# Patient Record
(demographics unavailable — no encounter records)

---

## 2024-11-22 NOTE — ADDENDUM
[FreeTextEntry1] : Documented by Beatriz Nathan acting as a scribe for Dr. Laron Barber on 11/22/2024. All medical record entries made by the Scribe were at my, Dr. Laron Barber's, direction and personally dictated by me on 11/22/2024. I have reviewed the chart and agree that the record accurately reflects my personal performance of the history, physical exam, assessment and plan. I have also personally directed, reviewed, and agree with the discharge instructions.

## 2024-11-22 NOTE — PHYSICAL EXAM
[No Acute Distress] : no acute distress [Normal Oropharynx] : normal oropharynx [II] : Mallampati Class: II [Normal Appearance] : normal appearance [No Neck Mass] : no neck mass [Normal Rate/Rhythm] : normal rate/rhythm [Normal S1, S2] : normal s1, s2 [No Murmurs] : no murmurs [No Resp Distress] : no resp distress [No Abnormalities] : no abnormalities [Benign] : benign [Normal Gait] : normal gait [No Clubbing] : no clubbing [No Cyanosis] : no cyanosis [No Edema] : no edema [FROM] : FROM [Normal Color/ Pigmentation] : normal color/ pigmentation [No Focal Deficits] : no focal deficits [Oriented x3] : oriented x3 [Normal Affect] : normal affect [TextBox_2] : overweight [TextBox_68] : I:E ratio 1:3; very mild forced expiratory wheeze

## 2024-11-22 NOTE — ASSESSMENT
[FreeTextEntry1] : Ms. LEVENTHALMALKIN is a 58 year old female coming into the office today for an initial evaluation with a prior history of chickenpox as a kid, overweight, osteoarthritis of right knee, severe persistent asthma, ?tracheal stenosis, Hashimoto's thyroiditis, alcoholism- SOB, chronic cough, severe persistent asthma, allergy, LPR/GERD, sleep apnea, TBM- s/p tracheobronchoplasty for TBM- residual cough (improved) - mild respiratory Sx (environmental) - residual respiratory Sx s/p URI (on Tezspire)- ?laryngospasm, ?OSAS  The patient's SOB is felt to be multifactorial: - Overweight - Out-of Shape - Poor breathing mechanics - (+) Tracheomalacia - Chronic cough  -severe persistent Asthma -?laryngospasm  -Allergies  -GERD/LPR - Cardiac  Problem 1:  severe Persistent Asthma -continue Breztri 2 puffs BID (add a spacer) -continue Xopenex 0.63 via nebulizer q6H prn  -continue Singulair 10 mg QHS -add Airsupra 2 inhalations Q12H PRN  -add  mg BID (11/2024) - Inhaler technique reviewed as well as oral hygiene techniques reviewed with patient. Avoidance of cold air, extremes of temperature, rescue inhaler should be used before exercise. Order of medication reviewed with patient. Recommended use of a cool mist humidifier in the bedroom. - Asthma is believed to be caused by inherited (genetic) and environmental factor, but its exact cause is unknown. Asthma may be triggered by allergens, lung infections, or irritants in the air. Asthma triggers are different for each person  problem 1A: biologic evaluation -on Tezspire as of 1/2024 -she is experiencing fewer exacerbations and better Sx control on Tezspire therapy  -type 2 asthma which accounts for approximately 70% of all cases is indicated by high eosinophil counts and elevated levels of T2 cytokines. Six biologic agents are FDA approved to treat moderate to severe asthma by targeting various cytokines and cell surface receptors involved in the inflammatory process in asthma. Several biologic therapies are also indicated for other inflammatory conditions marked by high eosinophils. Efficacy of biologic therapy should be assessed after 4-6 months of treatment.  Problem 2: Allergy - continue Clarinex 5 mg QHS - s/p given for blood work: - asthma profile, - food IgE panel, - eosinophil level, - IgE level, - Vitamin D level - Environmental measures for allergies were encouraged including mattress and pillow cover, air purifier, and environmental controls.  Problem 3: s/p Tracheomalacia - tracheobronchoplasty - s/p Dynamic CT -continue gabapentin 100mg q8h but to 300mg QHS if needed  - Tracheomalacia is usually acquired in adults and common causes include damage by tracheostomy or endotracheal intubation damaging the tracheal cartilage with increase risk with multiple intubations, prolonged intubation, and concurrent high dose steroid therapy; external chest wall trauma and surgery; chronic compression of the trachea by benign etiologies (eg, benign mediastinal goiter) or malignancy; relapsing polychondritis; or recurrent infection. Tracheomalacia can be asymptomatic, however signs or symptoms can develop as the severity of the airway narrowing progresses with major symptoms include dyspnea, cough, and sputum retention. Other symptoms include severe paroxysms of coughing, wheezing or stridor, barking cough and may be exacerbated by forced expiration, cough, and valsalva maneuver. Tracheomalacia is diagnosed by a bronchoscopic visualization of dynamic airway collapse on dynamic chest CT. Therapy is warranted in symptomatic patients with severe tracheomalacia and includes surgical repair as tracheobronchoplasty. The patient was referred to Dr. Lobo Mccarty or Dr. Toni Wills, at [facility] for a surgical consult.  Problem 3A: ?Laryngospasm -complete ENT evaluation with Dr. Marroquin et al. -recommended acupuncture -consider speech therapy if confirmed  Problem 4: LPR/GERD -continue Pepcid 40 mg QHS -add Pantoprazole 40 mg QAM, pre-breakfast  - Things to avoid including overeating, spicy foods, tight clothing, eating within three hours of bed, this list is not all inclusive. - For treatment of reflux, possible options discussed including diet control, H2 blockers, PPIs, as well as coating motility agents discussed as treatment options. Timing of meals and proximity of last meal to sleep were discussed. If symptoms persist, a formal gastrointestinal evaluation is needed.  Problem 5: Chronic cough (Asthma, TBM, Allergies, GERD/LPR- likely all present) - Addressed above -continue gabapentin 100mg QAM, 300mg QHS - Any cough greater than three weeks duration-differential diagnosis includes-asthma, upper airway cough syndrome, post nasal drip syndrome, gastroesophageal reflux, laryngopharyngeal reflux, cardiac disease (congestive heart failure, medicines, effects, etc), medication effects (b-blockers, ace inhibitors, ARBs, glaucoma meds, etc.), smoking, infectious, multifactorial, etc.  Problem 6: Likely GABE (elevated Mallampati class, snoring) ([place]) - Get home sleep study - Sleep apnea is associated with adverse clinical consequences which an affect most organ systems. Cardiovascular disease risk includes arrhythmias, atrial fibrillation, hypertension, coronary artery disease, and stroke. Metabolic disorders include diabetes type 2, non-alcoholic fatty liver disease. Mood disorder especially depression; and cognitive decline especially in the elderly. Associations with chronic reflux/Doan's esophagus some but not all inclusive. -Reasons include arousal consistent with hypopnea; respiratory events most prominent in REM sleep or supine position; therefore sleep staging and body position are important for accurate diagnosis and estimation of AHI.  Problem 7: Cardiac -Recommended cardiac follow up evaluation with cardiologist if needed  Problem 8: Overweight/out of shape -recommended Berberine OTC - Weight loss, exercise, and diet control were discussed and are highly encouraged. Treatment options were given such as, aqua therapy, and contacting a nutritionist. Recommended to use the elliptical, stationary bike, less use of treadmill. Mindful eating was explained to the patient Obesity is associated with worsening asthma, shortness of breath, and potential for cardiac disease, diabetes, and other underlying medical conditions  Problem 9: Poor Breathing Mechanics - Recommended Lucy Macdonaldf and [place] breathing techniques - Proper breathing techniques were reviewed with an emphasis of exhalation. Patient instructed to breath in for 1 second and out for four seconds. Patient was encouraged to not talk while walking.  Problem 10: Health maintenance -TDaP vaccine given in office 08/27/2024  -recommended RSV vaccine 2026 -s/p flu shot 2024 -recommended strep pneumonia vaccines: Prevnar-20 vaccine, followed by Pneumo vaccine 23 one year following -recommended early intervention for URIs -recommended regular osteoporosis evaluations-recommended early dermatological evaluations -recommended after the age of 50 to the age of 70, colonoscopy every 5 years  F/P in 4-6 months pt is encouraged to call or fax the office with any questions or concerns. Explained to the pt in full detail with demonstrations how to use the inhalers and inhaler hygiene. -Education provided to the PT regarding their visit and conditions.

## 2024-11-22 NOTE — PROCEDURE
[FreeTextEntry1] : PFTs revealed normal flows; FEV1 was 2.47L, which is 115% of predicted; normal flow volume loop. PFTs were performed to evaluate for asthma  FENO was 25; a normal value being less than 25 Fractional exhaled nitric oxide (FENO) is regarded as a simple, noninvasive method for assessing eosinophilic airway inflammation. Produced by a variety of cells within the lung, nitric oxide (NO) concentrations are generally low in healthy individuals. However, high concentrations of NO appear to be involved in nonspecific host defense mechanisms and chronic inflammatory diseases such as asthma. The American Thoracic Society (ATS) therefore has recommended using FENO to aid in the diagnosis and monitoring of eosinophilic airway inflammation and asthma, and for identifying steroid responsive individuals whose chronic respiratory symptoms may be caused by airway inflammation.

## 2024-11-22 NOTE — HISTORY OF PRESENT ILLNESS
[TextBox_4] : Ms. LEVENTHALMALKIN is a 58 year old female with a history of SOB, chronic cough, severe persistent asthma, allergy, LPR/GERD, sleep apnea, TBM presenting to the office today for a follow-up pulmonary evaluation. Her chief complaint is  -she notes she's a 6.5/10 at this time -she notes she has 2 types of coughs. One type is where she'll be walking or minimally exerting, and she coughs/chokes. The cough comes from her trachea. When she has this cough, she can't walk long distances. She doesn't bring up mucus with that cough. She's not experiencing that cough at this time. This cough is sometimes triggered with eating -she notes she brings up mucus when she has her asthmatic cough -she notes vision is stable  -she notes she sometimes wakes up with a choking feeling. She coughs 15 minutes before she can fall back asleep -she notes having a salty taste in her mouth -she denies SOB when lying supine or in the middle of the night  -she notes using Breztri with the spacer -she notes she's on Singulair, Gabapentin 100 mg in the morning, 300 mg at night, Lexapro, Pepcid 40 mg QHS, Benadryl at night -she notes snoring -she doesn't know how her cough is affected if she stops taking gabapentin because she uses it regularly -she notes she's using her rescue inhaler -she notes she's on Tezspire  -she denies any headaches, nausea, emesis, fever, chills, sweats, chest pain, chest pressure, wheezing, palpitations, diarrhea, constipation, dysphagia, vertigo, arthralgias, myalgias, leg swelling, itchy eyes, itchy ears, heartburn, reflux, or sour taste in the mouth.

## 2024-11-22 NOTE — REASON FOR VISIT
[Follow-Up] : a follow-up visit [Spouse] : spouse [TextBox_44] : SOB, chronic cough, severe persistent asthma, allergy, LPR/GERD, sleep apnea, TBM

## 2024-12-18 NOTE — PHYSICAL EXAM
[Midline] : trachea located in midline position [] : septum deviated to the right [Normal] : external appearance is normal [de-identified] : AU impacted cerumen

## 2024-12-18 NOTE — HISTORY OF PRESENT ILLNESS
[de-identified] : 58 y/o F referred by Dr. Barber for evaluation of laryngospasms  During last visit in November, recommended acupuncture and consider speech therapy, possible botox injections She has had a chronic cough for over 20 years.  She has a history of tracheomalacia -- s/p FB, Rt VATS, R.A., tracheobronchoplasty and MLND on 9/7/22 with Dr. Lobo Mccarty. She reports within the past year progressively having more discomfort with breathing -- unable to take a deep breath in. When bending over reports constant discomfort with breathing.  Dyspnea on exertion.  Constant "choking" sensation even when not eating.  When swallowing food occasionally feels like coughing. No unintentional weight loss. No swallow studies done.  She currently takes famotidine every night for reflux  Voice at baseline is hoarse -- slightly getting more raspy. Able to project. Denies total loss of voice or vocal fatigue.  Recent sinus infection given antibiotics course.

## 2024-12-18 NOTE — CONSULT LETTER
[FreeTextEntry2] : Dr. Robles Guthrie  [address] [place], [place] [id number] [phone] [FreeTextEntry3] : Wilfredo Marroquin MD, PhD Chief, Division of Laryngology Department of Otolaryngology [facility]olaryngology, [facility]  of Otolaryngology [facility]

## 2025-01-08 NOTE — PHYSICAL EXAM
[No Acute Distress] : no acute distress [Normal Oropharynx] : normal oropharynx [II] : Mallampati Class: II [Normal Appearance] : normal appearance [No Neck Mass] : no neck mass [Normal Rate/Rhythm] : normal rate/rhythm [Normal S1, S2] : normal s1, s2 [No Murmurs] : no murmurs [No Resp Distress] : no resp distress [No Abnormalities] : no abnormalities [Benign] : benign [Normal Gait] : normal gait [No Clubbing] : no clubbing [No Cyanosis] : no cyanosis [No Edema] : no edema [FROM] : FROM [Normal Color/ Pigmentation] : normal color/ pigmentation [No Focal Deficits] : no focal deficits [Oriented x3] : oriented x3 [Normal Affect] : normal affect [TextBox_2] : overweight [TextBox_68] : I:E ratio 1:3; forced expiratory wheeze

## 2025-01-08 NOTE — ADDENDUM
[FreeTextEntry1] : Documented by Sohan Allen acting as a scribe for Dr. Laron Barber on 01/08/2025. All medical record entries made by the Scribe were at my, Dr. Laron Barber's, direction and personally dictated by me on 01/08/2025. I have reviewed the chart and agree that the record accurately reflects my personal performance of the history, physical exam, assessment and plan. I have also personally directed, reviewed, and agree with the discharge instructions.

## 2025-01-08 NOTE — HISTORY OF PRESENT ILLNESS
[TextBox_4] : Ms. LEVENTHALMALKIN is a 59 year old female with a history of SOB, chronic cough, severe persistent asthma, allergy, LPR/GERD, sleep apnea, TBM presenting to the office today for a follow-up pulmonary evaluation. Her chief complaint is  -she notes feeling generally well -she notes going to the ENT -she notes her vocal cords have extra tissue surrounding it  -she notes next week she will be going for a swallow study  -she notes sleeping on an angle  -she notes her sleep improved at first but has since returned back to its poor state  -she notes using a different nebulizer solution  -she notes since starting budesonide her cough is not as "violent" or frequent  -she notes GERD Sx which she was put on omeprazole for  -she denies nocturia  -she notes diet is improving  -she notes appetite is stable -she denies taking any new medications, vitamins, or supplements  -she denies any headaches, nausea, emesis, fever, chills, sweats, chest pain, chest pressure, palpitations, diarrhea, constipation, dysphagia, vertigo, arthralgias, myalgias, leg swelling, itchy eyes, itchy ears, or sour taste in the mouth.

## 2025-01-08 NOTE — PROCEDURE
[FreeTextEntry1] : MIP mildly reduced  MEP WNL  FENO was 9; a normal value being less than 25 Fractional exhaled nitric oxide (FENO) is regarded as a simple, noninvasive method for assessing eosinophilic airway inflammation. Produced by a variety of cells within the lung, nitric oxide (NO) concentrations are generally low in healthy individuals. However, high concentrations of NO appear to be involved in nonspecific host defense mechanisms and chronic inflammatory diseases such as asthma. The American Thoracic Society (ATS) therefore has recommended using FENO to aid in the diagnosis and monitoring of eosinophilic airway inflammation and asthma, and for identifying steroid responsive individuals whose chronic respiratory symptoms may be caused by airway inflammation.   The American Thoracic Society (ATS) strongly recommends the use of FeNO measurement to aid in the assessment, management, and long-term monitoring of asthma. In their 2011 clinical practice guideline, the ATS emphasizes the importance of using FeNO.

## 2025-01-08 NOTE — ASSESSMENT
[FreeTextEntry1] : Ms. LEVENTHALMALKIN is a 59 year old female coming into the office today for an initial evaluation with a prior history of chickenpox as a kid, overweight, osteoarthritis of right knee, severe persistent asthma, ?tracheal stenosis, Hashimoto's thyroiditis, alcoholism- SOB, chronic cough, severe persistent asthma, allergy, LPR/GERD, sleep apnea, TBM- s/p tracheobronchoplasty for TBM- residual cough (improved) - mild respiratory Sx (environmental) - residual respiratory Sx s/p URI (on Tezspire)- ?laryngospasm, ?OSAS - pending FOB Lopez/Josef  The patient's SOB is felt to be multifactorial: - Overweight - Out-of Shape - Poor breathing mechanics - (+) Tracheomalacia - Chronic cough  -severe persistent Asthma -?laryngospasm  -Allergies  -GERD/LPR - Cardiac  Problem 1:  severe Persistent Asthma -continue Breztri 2 puffs BID (add a spacer) -continue Xopenex 0.63 via nebulizer q6H prn  -continue Singulair 10 mg QHS -add Airsupra 2 inhalations Q12H PRN  -add  mg BID (11/2024) - Inhaler technique reviewed as well as oral hygiene techniques reviewed with patient. Avoidance of cold air, extremes of temperature, rescue inhaler should be used before exercise. Order of medication reviewed with patient. Recommended use of a cool mist humidifier in the bedroom. - Asthma is believed to be caused by inherited (genetic) and environmental factor, but its exact cause is unknown. Asthma may be triggered by allergens, lung infections, or irritants in the air. Asthma triggers are different for each person  problem 1A: biologic evaluation -on Tezspire as of 1/2024 -she is experiencing fewer exacerbations and better Sx control on Tezspire therapy  -type 2 asthma which accounts for approximately 70% of all cases is indicated by high eosinophil counts and elevated levels of T2 cytokines. Six biologic agents are FDA approved to treat moderate to severe asthma by targeting various cytokines and cell surface receptors involved in the inflammatory process in asthma. Several biologic therapies are also indicated for other inflammatory conditions marked by high eosinophils. Efficacy of biologic therapy should be assessed after 4-6 months of treatment.  Problem 2: Allergy - continue Clarinex 5 mg QHS - s/p given for blood work: - asthma profile, - food IgE panel, - eosinophil level, - IgE level, - Vitamin D level - Environmental measures for allergies were encouraged including mattress and pillow cover, air purifier, and environmental controls.  Problem 3: s/p Tracheomalacia - tracheobronchoplasty - s/p Dynamic CT -continue gabapentin 100mg q8h but to 300mg QHS if needed  - Tracheomalacia is usually acquired in adults and common causes include damage by tracheostomy or endotracheal intubation damaging the tracheal cartilage with increase risk with multiple intubations, prolonged intubation, and concurrent high dose steroid therapy; external chest wall trauma and surgery; chronic compression of the trachea by benign etiologies (eg, benign mediastinal goiter) or malignancy; relapsing polychondritis; or recurrent infection. Tracheomalacia can be asymptomatic, however signs or symptoms can develop as the severity of the airway narrowing progresses with major symptoms include dyspnea, cough, and sputum retention. Other symptoms include severe paroxysms of coughing, wheezing or stridor, barking cough and may be exacerbated by forced expiration, cough, and valsalva maneuver. Tracheomalacia is diagnosed by a bronchoscopic visualization of dynamic airway collapse on dynamic chest CT. Therapy is warranted in symptomatic patients with severe tracheomalacia and includes surgical repair as tracheobronchoplasty. The patient was referred to Dr. Lobo Mccarty or Dr. Toni Wills, at [facility] for a surgical consult.  Problem 3A: ?Laryngospasm -pending FOB ([place]/Murn) -complete ENT evaluation with Dr. Gambino al. -recommended acupuncture -consider speech therapy if confirmed  Problem 4: LPR/GERD -continue Pepcid 40 mg QHS -add Pantoprazole 40 mg QAM, pre-breakfast  - Things to avoid including overeating, spicy foods, tight clothing, eating within three hours of bed, this list is not all inclusive. - For treatment of reflux, possible options discussed including diet control, H2 blockers, PPIs, as well as coating motility agents discussed as treatment options. Timing of meals and proximity of last meal to sleep were discussed. If symptoms persist, a formal gastrointestinal evaluation is needed.  Problem 5: Chronic cough (Asthma, TBM, Allergies, GERD/LPR- likely all present) - Addressed above -continue gabapentin 100mg QAM, 300mg QHS - Any cough greater than three weeks duration-differential diagnosis includes-asthma, upper airway cough syndrome, post nasal drip syndrome, gastroesophageal reflux, laryngopharyngeal reflux, cardiac disease (congestive heart failure, medicines, effects, etc), medication effects (b-blockers, ace inhibitors, ARBs, glaucoma meds, etc.), smoking, infectious, multifactorial, etc.  Problem 6: (+) GABE (elevated Mallampati class, snoring) ([place]) - s/p home sleep study - DD  - Sleep apnea is associated with adverse clinical consequences which an affect most organ systems. Cardiovascular disease risk includes arrhythmias, atrial fibrillation, hypertension, coronary artery disease, and stroke. Metabolic disorders include diabetes type 2, non-alcoholic fatty liver disease. Mood disorder especially depression; and cognitive decline especially in the elderly. Associations with chronic reflux/Doan's esophagus some but not all inclusive. -Reasons include arousal consistent with hypopnea; respiratory events most prominent in REM sleep or supine position; therefore sleep staging and body position are important for accurate diagnosis and estimation of AHI.  Problem 7: Cardiac -Recommended cardiac follow up evaluation with cardiologist if needed  Problem 8: Overweight/out of shape -recommended Berberine OTC - Weight loss, exercise, and diet control were discussed and are highly encouraged. Treatment options were given such as, aqua therapy, and contacting a nutritionist. Recommended to use the elliptical, stationary bike, less use of treadmill. Mindful eating was explained to the patient Obesity is associated with worsening asthma, shortness of breath, and potential for cardiac disease, diabetes, and other underlying medical conditions  Problem 9: Poor Breathing Mechanics - Recommended Wizandra Hof and [place] breathing techniques - Proper breathing techniques were reviewed with an emphasis of exhalation. Patient instructed to breath in for 1 second and out for four seconds. Patient was encouraged to not talk while walking.  Problem 10: Health maintenance -TDaP vaccine given in office 08/27/2024  -recommended RSV vaccine 2026 -s/p flu shot 2024 -recommended strep pneumonia vaccines: Prevnar-20 vaccine, followed by Pneumo vaccine 23 one year following -recommended early intervention for URIs -recommended regular osteoporosis evaluations-recommended early dermatological evaluations -recommended after the age of 50 to the age of 70, colonoscopy every 5 years  F/P in 4-6 months pt is encouraged to call or fax the office with any questions or concerns. Explained to the pt in full detail with demonstrations how to use the inhalers and inhaler hygiene. -Education provided to the PT regarding their visit and conditions.

## 2025-01-15 NOTE — ASSESSMENT
[FreeTextEntry1] : MODIFIED BARIUM SWALLOW STUDY   Date of Report: 1/15/2025  Date of Evaluation: 1/15/2025  Patient Name: Risa Ann Leventhal Malkin  YOB: 1965  Primary Diagnosis: OroPharyngeal Dysphagia  Treatment Diagnosis:  OroPharyngeal Dysphagia  Referring Physician:  Dr. Marroquin   Impressions/Results: There was trace penetration with delayed aspiration for consecutive sips of thin liquid which patient was sensate to, given cough response which was effective in clearing contrast from the airway. No penetration or aspiration before, during, or after the swallow on puree, regular, mildly thick or small single sips of thin liquids.   Reason For Referral: This 59 year old female was seen for a Modified Barium Swallow to objectively assess swallow function, rule out aspiration, and assess for safest diet consistency.   Patient reports confirmed surgical history specifically year of 2022 per chart and reports recent diagnosis of laryngospasms. Patient's chief complaint of "coughing and choking," which occurs "randomly, whether I am eating/drinking or answering the phone at work." Patient reports sudden onset of certain utterances such as answering phone saying "Thank you for calling..." frequently initiate choking sensation and coughing fit. Patient denied ever needing Heimlich maneuver.     Current Nutritional Intake: Regular/Thin    Medical History: Patient recently seen by Dr. Marroquin. Medical history per EMR:   Active Problems  Abnormal CT of the chest (793.2) (R93.89)  Allergic rhinitis (477.9) (J30.9)  Cough, persistent (786.2) (R05.3)  GERD (gastroesophageal reflux disease) (530.81) (K21.9)  Laryngospasm (478.75) (J38.5)  LPRD (laryngopharyngeal reflux disease) (478.79) (K21.9)  Overweight (278.02) (E66.3)  Paroxysmal dyspnea (786.09) (R06.00)  Post-operative pain (338.18) (G89.18)  Primary localized osteoarthritis of right knee (715.16) (M17.11)  Severe persistent asthma, poorly-controlled (493.90) (J45.50)  Snoring (786.09) (R06.83)  SOB (shortness of breath) (786.05) (R06.02)  Denied: History of Tracheal stenosis  Tracheomalacia, acquired (519.19) (J39.8)   ASSESSMENT  The patient was assessed seated in the lateral plane in the [facility] Radiology Suite, with Radiologist present.  The patient was alert, cooperative.  Secretion management was adequate.  There was no coughing, throat clearing or wet/gurgly vocal quality prior to test administration.    Consistencies Administered:    Solids:  Puree, Regular  Liquids:  Mildly Thick, Thin   SUMMARY & IMPRESSION  1.  Functional oral phase for puree, regular, mildly thick and thin liquids characterized by adequate bolus retrieval, oral containment, adequate mastication of regular, adequate anterior posterior transfer and oral clearance. There was one isolated episode of premature spillage to the pyriform sinuses for thin liquids. 2. Mild pharyngeal phase characterized by prompt swallow initiation, adequate hyolaryngeal elevation, adequate base of tongue retraction, adequate epiglottic deflection, reduced laryngeal vestibule closure for consecutive/large volume sips of thin liquid only, adequate pharyngeal contractility, and pharyngeal clearance. There was trace penetration with delayed aspiration for consecutive sips of thin liquid which patient was sensate to, given cough response which was effective in clearing contrast from the airway. No penetration or aspiration before, during, or after the swallow on puree, regular, mildly thick or small single sips of thin liquids.   An Esophageal Screen was completed. The patient was turned to AP view and given a Puree bolus, Thin liquid bolus, and a Barium Tablet with a cup of thin liquids. The tablet was noted to course through the esophagus without hold up. Of Note; this is not a full/complete evaluation of the esophagus.    Aspiration - Penetration Scale:   PUREE: 1  SOLIDS: 1  MODERATELY THICK LIQUIDS: N/A  MILDLY THICK LIQUIDS: 1  THIN LIQUIDS: PA (1) for small single sips, PA (6) for large volume/consecutive sips   Aspiration - Penetration Scale   (Libia et al Dysphagia 11:93-98 (April 1996), Aspiration-Penetration Scale)  1.    Material does not enter the airway  2.    Material enters the airway, remains above the vocal folds, and is ejected from the airway  3.    Material enters the airway, remains above the vocal folds, and is not ejected  4.    Material enters the airway, contacts the vocal folds, and is ejected from the airway  5.    Material enters the airway, contacts the vocal folds, and is not ejected from the airway  6.    Material enters the airway, passes below the vocal folds and is ejected into the larynx or out of the airway  7.    Material enters the airway, passes below the vocal folds, and is not ejected from the trachea despite effort  8.    Material enters the airway, passes below the vocal folds, and no effort is made to eject   Recommendations:  1.) Regular & Thin Liquids via Small, Single Sips  2.) Aspiration/Reflux Precautions  3.) Follow up with referring physician  4.) Swallowing Therapy to maximize swallow mechanism    The above results and recommendations have been discussed with the patient. All questions were answered with patient demonstrating good understanding.    Should you have any additional concerns, please contact the Center at [phone].   Sussy Cisneros MS, CCC-SLP  Speech-Language Pathologist   [facility]

## 2025-03-10 NOTE — ASSESSMENT
[FreeTextEntry1] : Ms. LEVENTHALMALKIN is a 59 year old female with PMH of chronic cough and tracheobronchomalacia. She was seen by Thoracic Surgery in the past and undergone a tracheobronchoplasty and MLND on 9/7/22 with Dr. Mccarty. However, she continues to have chronic cough and spasm and has been following with Dr Barber and Lopez for management.   Ms. LEVENTHALMALKIN is following up with Interventional Pulmonology today status post joint case with Dr. Marroquin where she had a supraglottoplasty, direct laryngoscopy/bronchoscopy, superior laryngeal nerve injection on 02/04/2025.  Patient tolerated procedure well. No issues post-procedure. No hemoptysis, cough, sore throat, dyspnea or chest pain.  Bronchomalacia and tracheomalacia did not look severe.   The results Laryngeal tissue with mild squamous hyperplasia and focal minimal chronic inflammation. Culture results were negative to date.    These results were discussed with the patient. The next step at this time is to follow up with Dr. Marroquin. Patient was given the opportunity to ask questions and all questions were answered.

## 2025-03-10 NOTE — HISTORY OF PRESENT ILLNESS
[Former] : former [Never] : never [TextBox_4] : Interventional Pulmonology Consultation Note First Visit with IP: Mar 3 2025 1:30PM   Ms. LEVENTHALMALKIN is a 59-year-old with PMH of chronic cough and tracheobronchomalacia.  She was seen by Thoracic Surgery in the past and undergone a tracheobronchoplasty and MLND on 9/7/22 with Dr. Mccarty. However, she continues to have chronic cough and spasm and has been following with Dr Barber and Lopez for management.   Her in office evaluation with Dr. Marroquin was notable supraglottic collapse consistent with laryngomalacia as well as chronic laryngitis. She opted to undergo Microsuspension direct laryngoscopy with supraglottoplasty, injection laryngoplasty, bilateral superior laryngeal nerve injections, bronchoscopy with bronchoalveolar lavage. All of which was done 02/04/2025. Result showed Laryngeal tissue with mild squamous hyperplasia and focal minimal chronic inflammation.   Ms. LEVENTHALMALKIN past medical history is notablefor history of smoking which started at the age of 19 and would consume up to 1.5 pack of cigarettes a day and quit smoking in 2005.  She denies any personal/family history of cancer, fungal/mycobacterial infection, or emphysema  Today she verbalized that she has been doing well and has noted some improvement in her symptoms. She also verbalized that she has been compliant with speech therapy which has helped her manage her cough symptoms. In addition, she denies having any recent episodes of  shortness of breath, wheezing, or hemoptysis.

## 2025-03-11 NOTE — CONSULT LETTER
[Courtesy Letter:] : I had the pleasure of seeing your patient, [unfilled], in my office today. [Please see my note below.] : Please see my note below. [Consult Closing:] : Thank you very much for allowing me to participate in the care of this patient.  If you have any questions, please do not hesitate to contact me. [Sincerely,] : Sincerely, [FreeTextEntry2] : Dr. Robles Guthrie  [address] [place], [place] [address] [phone] [FreeTextEntry3] : Wilfredo Marroquin MD, PhD Chief, Division of Laryngology Department of Otolaryngology [facility] Pediatric Otolaryngology, [facility]  of Otolaryngology [facility] of [facility]

## 2025-03-11 NOTE — HISTORY OF PRESENT ILLNESS
[de-identified] : 59 year old female presents with follow up for laryngospasms and chronic cough for over 20 years.  History of tracheomalacia -- s/p FB, Rt VATS, R.A., tracheobronchoplasty and MLND on 9/7/22 with Dr. Lobo Mccarty. Now s/p  Microsuspension direct laryngoscopy with supraglottoplasty, injection laryngoplasty, bilateral superior laryngeal nerve injections, bronchoscopy with bronchoalveolar lavage 02/04/25   States voice is much stronger since surgery but continues to have spasms.  Undergoing laryngospasm therapy with Echo Lerma- practicing breathing techniques   Occasionally has globus sensation and throat irritation  Tolerating solid foods and thin liquids-intermittent coughing and choking when eating.  States last Thursday she was eating sausage and had a 20 min coughing fit  No recent aspiration PNA  Taking Famotidine 40mg and Omeprazole 40mg- following reflux precautions Patient denies odynophagia, pain while speaking, sob when speaking, hemoptysis, neck swelling or throat infections

## 2025-03-11 NOTE — ADDENDUM
[FreeTextEntry1] : Documented by Emily Davis acting as scribe for Dr. Marroquin on 03/11/2025. All Medical record entries made by the Scribe were at my, Dr. Marroquin, direction and personally dictated by me on 03/11/2025 . I have reviewed the chart and agree that the record accurately reflects my personal performance of the history, physical exam, assessment and plan. I have also personally directed, reviewed, and agreed with the discharge instructions.

## 2025-03-11 NOTE — PHYSICAL EXAM
[] : septum deviated to the right [Midline] : trachea located in midline position [Normal] : no rashes [de-identified] : AU impacted cerumen

## 2025-03-11 NOTE — HISTORY OF PRESENT ILLNESS
[de-identified] : 59 year old female presents with follow up for laryngospasms and chronic cough for over 20 years.  History of tracheomalacia -- s/p FB, Rt VATS, R.A., tracheobronchoplasty and MLND on 9/7/22 with Dr. Lobo Mccarty. Now s/p  Microsuspension direct laryngoscopy with supraglottoplasty, injection laryngoplasty, bilateral superior laryngeal nerve injections, bronchoscopy with bronchoalveolar lavage 02/04/25   States voice is much stronger since surgery but continues to have spasms.  Undergoing laryngospasm therapy with Echo Lerma- practicing breathing techniques   Occasionally has globus sensation and throat irritation  Tolerating solid foods and thin liquids-intermittent coughing and choking when eating.  States last Thursday she was eating sausage and had a 20 min coughing fit  No recent aspiration PNA  Taking Famotidine 40mg and Omeprazole 40mg- following reflux precautions Patient denies odynophagia, pain while speaking, sob when speaking, hemoptysis, neck swelling or throat infections

## 2025-03-11 NOTE — CONSULT LETTER
[Courtesy Letter:] : I had the pleasure of seeing your patient, [unfilled], in my office today. [Please see my note below.] : Please see my note below. [Consult Closing:] : Thank you very much for allowing me to participate in the care of this patient.  If you have any questions, please do not hesitate to contact me. [Sincerely,] : Sincerely, [FreeTextEntry2] : Dr. Robles Guthrie  [address] [place], [place] [id number] [phone] [FreeTextEntry3] : Wilfredo Marroquin MD, PhD Chief, Division of Laryngology Department of Otolaryngology [facility] Pediatric Otolaryngology, [facility]  of Otolaryngology [facility] of [facility]

## 2025-06-04 NOTE — CONSULT LETTER
[Courtesy Letter:] : I had the pleasure of seeing your patient, [unfilled], in my office today. [Please see my note below.] : Please see my note below. [Consult Closing:] : Thank you very much for allowing me to participate in the care of this patient.  If you have any questions, please do not hesitate to contact me. [Sincerely,] : Sincerely, [FreeTextEntry2] : Dr. Robles Guthrie  [address] [place], [place] [id number] [phone] [FreeTextEntry3] : Wilfredo Marroquin MD, PhD Chief, Division of Laryngology Department of Otolaryngology [facility] Pediatric Otolaryngology, [facility]  of Otolaryngology [facility] of [facility]

## 2025-06-04 NOTE — HISTORY OF PRESENT ILLNESS
[de-identified] : 59 year old female presents with follow up for laryngospasms and chronic cough for over 20 years.  History of tracheomalacia -- s/p FB, Rt VATS, R.A., tracheobronchoplasty and MLND on 9/7/22 with Dr. Lobo Mccarty. Now s/p MDL with supraglottoplasty, injection laryngoplasty, bilateral superior laryngeal nerve injections, bronchoscopy with bronchoalveolar lavage 02/04/25  Presents for follow up evaluation. States voice is much stronger since surgery but continues to have spasms.  Undergoing laryngospasm therapy with Echo Lerma- practicing breathing techniques. Coughing frequently at baseline. Tolerating regular diet - intermittent coughing and choking when eating.  No recent aspiration PNA. Taking Famotidine 40mg and Omeprazole 40mg- following reflux precautions.

## 2025-06-04 NOTE — PHYSICAL EXAM
[] : septum deviated to the right [Midline] : trachea located in midline position [Normal] : no rashes [de-identified] : AU impacted cerumen

## 2025-06-04 NOTE — ADDENDUM
[FreeTextEntry1] :  Documented by Emily Davis acting as scribe for Dr. Marroquin on 06/04/2025. All Medical record entries made by the Scribe were at my, Dr. Marroquin, direction and personally dictated by me on 06/04/2025 . I have reviewed the chart and agree that the record accurately reflects my personal performance of the history, physical exam, assessment and plan. I have also personally directed, reviewed, and agreed with the discharge instructions.